# Patient Record
Sex: MALE | Race: WHITE | NOT HISPANIC OR LATINO | Employment: OTHER | URBAN - NONMETROPOLITAN AREA
[De-identification: names, ages, dates, MRNs, and addresses within clinical notes are randomized per-mention and may not be internally consistent; named-entity substitution may affect disease eponyms.]

---

## 2023-08-03 ENCOUNTER — HOSPITAL ENCOUNTER (EMERGENCY)
Facility: HOSPITAL | Age: 73
Discharge: HOME | End: 2023-08-03
Payer: MEDICARE

## 2023-08-03 VITALS
HEART RATE: 68 BPM | OXYGEN SATURATION: 97 % | SYSTOLIC BLOOD PRESSURE: 144 MMHG | BODY MASS INDEX: 24.38 KG/M2 | RESPIRATION RATE: 18 BRPM | WEIGHT: 190 LBS | DIASTOLIC BLOOD PRESSURE: 80 MMHG | HEIGHT: 74 IN | TEMPERATURE: 98 F

## 2023-08-03 DIAGNOSIS — J06.9 URI WITH COUGH AND CONGESTION: Primary | ICD-10-CM

## 2023-08-03 DIAGNOSIS — R05.9 COUGH: ICD-10-CM

## 2023-08-03 LAB
ALBUMIN SERPL BCP-MCNC: 3.4 G/DL (ref 3.5–5)
ALBUMIN/GLOB SERPL: 0.8 {RATIO}
ALP SERPL-CCNC: 91 U/L (ref 45–115)
ALT SERPL W P-5'-P-CCNC: 61 U/L (ref 16–61)
ANION GAP SERPL CALCULATED.3IONS-SCNC: 8 MMOL/L (ref 7–16)
AST SERPL W P-5'-P-CCNC: 55 U/L (ref 15–37)
BASOPHILS # BLD AUTO: 0.01 K/UL (ref 0–0.2)
BASOPHILS NFR BLD AUTO: 0.4 % (ref 0–1)
BILIRUB SERPL-MCNC: 0.5 MG/DL (ref ?–1.2)
BUN SERPL-MCNC: 17 MG/DL (ref 7–18)
BUN/CREAT SERPL: 17 (ref 6–20)
CALCIUM SERPL-MCNC: 8 MG/DL (ref 8.5–10.1)
CHLORIDE SERPL-SCNC: 103 MMOL/L (ref 98–107)
CO2 SERPL-SCNC: 33 MMOL/L (ref 21–32)
CREAT SERPL-MCNC: 1.01 MG/DL (ref 0.7–1.3)
DIFFERENTIAL METHOD BLD: ABNORMAL
EGFR (NO RACE VARIABLE) (RUSH/TITUS): 78 ML/MIN/1.73M2
EOSINOPHIL # BLD AUTO: 0.17 K/UL (ref 0–0.5)
EOSINOPHIL NFR BLD AUTO: 7.3 % (ref 1–4)
EOSINOPHIL NFR BLD MANUAL: 5 % (ref 1–4)
ERYTHROCYTE [DISTWIDTH] IN BLOOD BY AUTOMATED COUNT: 16 % (ref 11.5–14.5)
GLOBULIN SER-MCNC: 4.2 G/DL (ref 2–4)
GLUCOSE SERPL-MCNC: 103 MG/DL (ref 74–106)
HCT VFR BLD AUTO: 45.8 % (ref 40–54)
HGB BLD-MCNC: 15.3 G/DL (ref 13.5–18)
LYMPHOCYTES # BLD AUTO: 0.81 K/UL (ref 1–4.8)
LYMPHOCYTES NFR BLD AUTO: 34.6 % (ref 27–41)
LYMPHOCYTES NFR BLD MANUAL: 36 % (ref 27–41)
MCH RBC QN AUTO: 30.4 PG (ref 27–31)
MCHC RBC AUTO-ENTMCNC: 33.4 G/DL (ref 32–36)
MCV RBC AUTO: 91.1 FL (ref 80–96)
MONOCYTES # BLD AUTO: 0.32 K/UL (ref 0–0.8)
MONOCYTES NFR BLD AUTO: 13.7 % (ref 2–6)
MONOCYTES NFR BLD MANUAL: 15 % (ref 2–6)
MPC BLD CALC-MCNC: 11.3 FL (ref 9.4–12.4)
NEUTROPHILS # BLD AUTO: 1.03 K/UL (ref 1.8–7.7)
NEUTROPHILS NFR BLD AUTO: 44 % (ref 53–65)
NEUTS BAND NFR BLD MANUAL: 1 % (ref 1–5)
NEUTS SEG NFR BLD MANUAL: 43 % (ref 50–62)
NRBC BLD MANUAL-RTO: ABNORMAL %
PLATELET # BLD AUTO: 122 K/UL (ref 150–400)
POTASSIUM SERPL-SCNC: 3.9 MMOL/L (ref 3.5–5.1)
PROT SERPL-MCNC: 7.6 G/DL (ref 6.4–8.2)
RBC # BLD AUTO: 5.03 M/UL (ref 4.6–6.2)
SODIUM SERPL-SCNC: 140 MMOL/L (ref 136–145)
WBC # BLD AUTO: 2.34 K/UL (ref 4.5–11)

## 2023-08-03 PROCEDURE — 99284 EMERGENCY DEPT VISIT MOD MDM: CPT | Mod: 25

## 2023-08-03 PROCEDURE — 25000003 PHARM REV CODE 250: Performed by: NURSE PRACTITIONER

## 2023-08-03 PROCEDURE — 63600175 PHARM REV CODE 636 W HCPCS: Performed by: NURSE PRACTITIONER

## 2023-08-03 PROCEDURE — 85025 COMPLETE CBC W/AUTO DIFF WBC: CPT | Performed by: NURSE PRACTITIONER

## 2023-08-03 PROCEDURE — 80053 COMPREHEN METABOLIC PANEL: CPT | Performed by: NURSE PRACTITIONER

## 2023-08-03 PROCEDURE — 99284 EMERGENCY DEPT VISIT MOD MDM: CPT | Mod: GF | Performed by: NURSE PRACTITIONER

## 2023-08-03 PROCEDURE — 96372 THER/PROPH/DIAG INJ SC/IM: CPT | Performed by: NURSE PRACTITIONER

## 2023-08-03 RX ORDER — CEFTRIAXONE 1 G/1
1 INJECTION, POWDER, FOR SOLUTION INTRAMUSCULAR; INTRAVENOUS ONCE
Status: COMPLETED | OUTPATIENT
Start: 2023-08-03 | End: 2023-08-03

## 2023-08-03 RX ORDER — LIDOCAINE HYDROCHLORIDE 10 MG/ML
2.1 INJECTION INFILTRATION; PERINEURAL ONCE
Status: COMPLETED | OUTPATIENT
Start: 2023-08-03 | End: 2023-08-03

## 2023-08-03 RX ORDER — AZITHROMYCIN 250 MG/1
TABLET, FILM COATED ORAL
Qty: 6 TABLET | Refills: 0 | Status: SHIPPED | OUTPATIENT
Start: 2023-08-03 | End: 2023-08-08

## 2023-08-03 RX ADMIN — LIDOCAINE HYDROCHLORIDE 2.1 ML: 10 INJECTION, SOLUTION INFILTRATION; PERINEURAL at 04:08

## 2023-08-03 RX ADMIN — CEFTRIAXONE 1 G: 1 INJECTION, POWDER, FOR SOLUTION INTRAMUSCULAR; INTRAVENOUS at 04:08

## 2023-08-03 NOTE — ED PROVIDER NOTES
"Encounter Date: 8/3/2023       History     Chief Complaint   Patient presents with    Cough    Nasal Congestion     72 y/o WM wit PMHx of RA  presents to the ED with complaint of cough, nasal congestion and "feels wore down" x 1 week. Denies fever, cough is now productive, but denies fever. Has not been seen by a provider since symptoms started.  Denies sick contacts.    The history is provided by the patient.     Review of patient's allergies indicates:  No Known Allergies  Past Medical History:   Diagnosis Date    Arthritis     Hypertension      Past Surgical History:   Procedure Laterality Date    BACK SURGERY      CHOLECYSTECTOMY      JOINT REPLACEMENT       History reviewed. No pertinent family history.  Social History     Tobacco Use    Smoking status: Never    Smokeless tobacco: Never   Substance Use Topics    Alcohol use: Never    Drug use: Never     Review of Systems   Constitutional:  Positive for activity change, appetite change and fatigue. Negative for fever.   HENT:  Positive for congestion. Negative for ear pain, sore throat and trouble swallowing.    Eyes:  Negative for photophobia, pain and visual disturbance.   Respiratory:  Positive for cough and wheezing. Negative for shortness of breath.    Cardiovascular: Negative.  Negative for chest pain, palpitations and leg swelling.   Gastrointestinal:  Negative for abdominal pain, constipation, diarrhea, nausea and vomiting.   Genitourinary: Negative.  Negative for dysuria and frequency.   Musculoskeletal: Negative.    Skin: Negative.    Neurological:  Positive for weakness (generalized). Negative for headaches.   Psychiatric/Behavioral: Negative.         Physical Exam     Initial Vitals   BP Pulse Resp Temp SpO2   08/03/23 1415 08/03/23 1415 08/03/23 1415 08/03/23 1415 08/03/23 1429   (!) 144/80 68 18 98.1 °F (36.7 °C) 97 %      MAP       --                Physical Exam    Nursing note and vitals reviewed.  Constitutional: Vital signs are normal. He " appears well-developed and well-nourished. He is cooperative. He does not have a sickly appearance. He appears ill. No distress.   HENT:   Head: Normocephalic and atraumatic.   Right Ear: Tympanic membrane, external ear and ear canal normal.   Left Ear: Tympanic membrane, external ear and ear canal normal.   Nose: Nose normal. Right sinus exhibits no maxillary sinus tenderness and no frontal sinus tenderness. Left sinus exhibits no maxillary sinus tenderness and no frontal sinus tenderness.   Mouth/Throat: Uvula is midline, oropharynx is clear and moist and mucous membranes are normal.   Eyes: Conjunctivae are normal.   Neck: Neck supple.   Normal range of motion.   Full passive range of motion without pain.     Cardiovascular:  Normal rate, normal heart sounds, intact distal pulses and normal pulses.           Pulmonary/Chest: Effort normal and breath sounds normal.   Abdominal: Abdomen is soft. There is no abdominal tenderness.   Musculoskeletal:         General: Normal range of motion.      Cervical back: Full passive range of motion without pain, normal range of motion and neck supple.     Neurological: He is alert and oriented to person, place, and time. He has normal strength.   Skin: Skin is warm and dry. Capillary refill takes less than 2 seconds.   Psychiatric: He has a normal mood and affect. His speech is normal and behavior is normal. Judgment and thought content normal. Cognition and memory are normal.         Medical Screening Exam   See Full Note    ED Course   Procedures  Labs Reviewed   COMPREHENSIVE METABOLIC PANEL - Abnormal; Notable for the following components:       Result Value    CO2 33 (*)     Calcium 8.0 (*)     Albumin 3.4 (*)     Globulin 4.2 (*)     AST 55 (*)     All other components within normal limits   CBC WITH DIFFERENTIAL - Abnormal; Notable for the following components:    WBC 2.34 (*)     RDW 16.0 (*)     Platelet Count 122 (*)     Neutrophils % 44.0 (*)     Neutrophils, Abs  1.03 (*)     Lymphocytes, Absolute 0.81 (*)     Monocytes % 13.7 (*)     Eosinophils % 7.3 (*)     All other components within normal limits   MANUAL DIFFERENTIAL - Abnormal; Notable for the following components:    Segmented Neutrophils, Man % 43 (*)     Monocytes, Man % 15 (*)     Eosinophils, Man % 5 (*)     All other components within normal limits   CBC W/ AUTO DIFFERENTIAL    Narrative:     The following orders were created for panel order CBC auto differential.  Procedure                               Abnormality         Status                     ---------                               -----------         ------                     CBC with Differential[[id number]]        Abnormal            Final result               Manual Differential[[id number]]          Abnormal            Final result                 Please view results for these tests on the individual orders.          Imaging Results              X-Ray Chest PA And Lateral (Final result)  Result time 08/03/23 15:54:58      Final result by Yordan Vazquez DO (08/03/23 15:54:58)                   Impression:      No acute pulmonary disease    Findings suggesting pulmonary arterial hypertension      Electronically signed by: Yordan Vazquez  Date:    08/03/2023  Time:    15:54               Narrative:    EXAMINATION:  XR CHEST PA AND LATERAL    CLINICAL HISTORY:  Cough, unspecified    TECHNIQUE:  XR CHEST PA AND LATERAL    COMPARISON:  2016    FINDINGS:  No lines or tubes.    Lungs are clear.    Normal pleura.    Enlargement of the pulmonary arteries    Cardiac silhouette is similar to comparison exam.    No obvious acute bone findings.                                       Medications   cefTRIAXone injection 1 g (1 g Intramuscular Given 8/3/23 1632)   LIDOcaine HCL 10 mg/ml (1%) injection 2.1 mL (2.1 mLs Intramuscular Given 8/3/23 1632)     Medical Decision Making:   Initial Assessment:   VS wnl. Physical exam wnl. Appears in NAD.   Differential  Diagnosis:   -Post COVID/Viral illness  -Bronchitis  -URI with cough  Clinical Tests:   Lab Tests: Ordered and Reviewed  The following lab test(s) were unremarkable: CBC and CMP       <> Summary of Lab: CBC: 2.34 (little low due to RA medications), H&H 15.3/45.8, Na 140, K 3.9, BUN/CR 17/1.01, glucose 1.01.  Radiological Study: Ordered and Reviewed  ED Management:  -CXR:No acute pulmonary disease. Findings suggesting pulmonary arterial hypertension.     Discharge MDM  I discussed lab and CXR results with patient. Patient denies history of pulmonary hypertension. He has been seen by cardiology about 2 years ago for check up due to being on RA medications. Denies any history of CAD. I Believe his symptoms are more respiratory related to post viral illness.   Patient was managed in the ED with:  -Rocephin 1 gm IM X 1.  Prescription given for Zpak. May take Mucinex DM otc for cough and congestion. He will avoid NSAIDs and follow up with his cardiologist to rule out pulmonary artery hypertension that was suggested on CXR.   Patient was discharged in stable condition.  Detailed return precautions discussed. Patient agreed to treatment plan and verbalized understanding.                         Clinical Impression:   Final diagnoses:  [R05.9] Cough  [J06.9] URI with cough and congestion (Primary)        ED Disposition Condition    Discharge Stable          ED Prescriptions    None       Follow-up Information       Follow up With Specialties Details Why Contact Info    Primary Care Provider  Call  If symptoms worsen     Cardiology  Call in 1 day to schedule follow up visit for evaluation. CXR findings were suggestive of pulmanary arterial hypertension.              Brea Conn, [facility]  08/03/23 2269

## 2023-08-05 ENCOUNTER — TELEPHONE (OUTPATIENT)
Dept: EMERGENCY MEDICINE | Facility: HOSPITAL | Age: 73
End: 2023-08-05
Payer: OTHER GOVERNMENT

## 2025-01-24 ENCOUNTER — HOSPITAL ENCOUNTER (EMERGENCY)
Facility: HOSPITAL | Age: 75
Discharge: HOME | End: 2025-01-24
Payer: MEDICARE

## 2025-01-24 VITALS
WEIGHT: 208 LBS | BODY MASS INDEX: 27.57 KG/M2 | RESPIRATION RATE: 18 BRPM | HEIGHT: 73 IN | OXYGEN SATURATION: 97 % | SYSTOLIC BLOOD PRESSURE: 175 MMHG | DIASTOLIC BLOOD PRESSURE: 88 MMHG | TEMPERATURE: 98 F | HEART RATE: 83 BPM

## 2025-01-24 DIAGNOSIS — I10 PRIMARY HYPERTENSION: Primary | ICD-10-CM

## 2025-01-24 DIAGNOSIS — R06.02 SHORTNESS OF BREATH: ICD-10-CM

## 2025-01-24 DIAGNOSIS — R60.9 SWELLING: ICD-10-CM

## 2025-01-24 LAB
ALBUMIN SERPL BCP-MCNC: 3.6 G/DL (ref 3.4–4.8)
ALBUMIN/GLOB SERPL: 0.9 {RATIO}
ALP SERPL-CCNC: 71 U/L (ref 40–150)
ALT SERPL W P-5'-P-CCNC: 18 U/L
ANION GAP SERPL CALCULATED.3IONS-SCNC: 15 MMOL/L (ref 7–16)
AST SERPL W P-5'-P-CCNC: 35 U/L (ref 5–34)
BASOPHILS # BLD AUTO: 0.02 K/UL (ref 0–0.2)
BASOPHILS NFR BLD AUTO: 0.4 % (ref 0–1)
BILIRUB SERPL-MCNC: 1 MG/DL
BUN SERPL-MCNC: 19 MG/DL (ref 8–26)
BUN/CREAT SERPL: 15 (ref 6–20)
CALCIUM SERPL-MCNC: 9.4 MG/DL (ref 8.8–10)
CHLORIDE SERPL-SCNC: 106 MMOL/L (ref 98–107)
CO2 SERPL-SCNC: 25 MMOL/L (ref 23–31)
CREAT SERPL-MCNC: 1.24 MG/DL (ref 0.72–1.25)
DIFFERENTIAL METHOD BLD: ABNORMAL
EGFR (NO RACE VARIABLE) (RUSH/TITUS): 61 ML/MIN/1.73M2
EOSINOPHIL # BLD AUTO: 0.36 K/UL (ref 0–0.5)
EOSINOPHIL NFR BLD AUTO: 7.3 % (ref 1–4)
ERYTHROCYTE [DISTWIDTH] IN BLOOD BY AUTOMATED COUNT: 14.9 % (ref 11.5–14.5)
GLOBULIN SER-MCNC: 4.1 G/DL (ref 2–4)
GLUCOSE SERPL-MCNC: 103 MG/DL (ref 82–115)
HCT VFR BLD AUTO: 51.2 % (ref 40–54)
HGB BLD-MCNC: 16.7 G/DL (ref 13.5–18)
LYMPHOCYTES # BLD AUTO: 1.29 K/UL (ref 1–4.8)
LYMPHOCYTES NFR BLD AUTO: 26.3 % (ref 27–41)
MCH RBC QN AUTO: 29.6 PG (ref 27–31)
MCHC RBC AUTO-ENTMCNC: 32.6 G/DL (ref 32–36)
MCV RBC AUTO: 90.8 FL (ref 80–96)
MONOCYTES # BLD AUTO: 0.36 K/UL (ref 0–0.8)
MONOCYTES NFR BLD AUTO: 7.3 % (ref 2–6)
MPC BLD CALC-MCNC: 11.8 FL (ref 9.4–12.4)
NEUTROPHILS # BLD AUTO: 2.88 K/UL (ref 1.8–7.7)
NEUTROPHILS NFR BLD AUTO: 58.7 % (ref 53–65)
NT-PROBNP SERPL-MCNC: 318 PG/ML (ref 1–450)
PLATELET # BLD AUTO: 176 K/UL (ref 150–400)
POTASSIUM SERPL-SCNC: 3.5 MMOL/L (ref 3.5–5.1)
PROT SERPL-MCNC: 7.7 G/DL (ref 5.8–7.6)
RBC # BLD AUTO: 5.64 M/UL (ref 4.6–6.2)
SODIUM SERPL-SCNC: 142 MMOL/L (ref 136–145)
TROPONIN I SERPL HS-MCNC: 7.5 NG/L
WBC # BLD AUTO: 4.91 K/UL (ref 4.5–11)

## 2025-01-24 PROCEDURE — 96374 THER/PROPH/DIAG INJ IV PUSH: CPT

## 2025-01-24 PROCEDURE — 85025 COMPLETE CBC W/AUTO DIFF WBC: CPT | Performed by: NURSE PRACTITIONER

## 2025-01-24 PROCEDURE — 83880 ASSAY OF NATRIURETIC PEPTIDE: CPT | Performed by: NURSE PRACTITIONER

## 2025-01-24 PROCEDURE — 80053 COMPREHEN METABOLIC PANEL: CPT | Performed by: NURSE PRACTITIONER

## 2025-01-24 PROCEDURE — 36415 COLL VENOUS BLD VENIPUNCTURE: CPT | Performed by: NURSE PRACTITIONER

## 2025-01-24 PROCEDURE — 99285 EMERGENCY DEPT VISIT HI MDM: CPT | Mod: 25

## 2025-01-24 PROCEDURE — 25000003 PHARM REV CODE 250: Performed by: NURSE PRACTITIONER

## 2025-01-24 PROCEDURE — 84484 ASSAY OF TROPONIN QUANT: CPT | Performed by: NURSE PRACTITIONER

## 2025-01-24 PROCEDURE — 93005 ELECTROCARDIOGRAM TRACING: CPT

## 2025-01-24 PROCEDURE — 96375 TX/PRO/DX INJ NEW DRUG ADDON: CPT

## 2025-01-24 PROCEDURE — 99285 EMERGENCY DEPT VISIT HI MDM: CPT | Mod: GF | Performed by: NURSE PRACTITIONER

## 2025-01-24 PROCEDURE — 63600175 PHARM REV CODE 636 W HCPCS: Performed by: NURSE PRACTITIONER

## 2025-01-24 PROCEDURE — 93010 ELECTROCARDIOGRAM REPORT: CPT | Mod: ,,, | Performed by: INTERNAL MEDICINE

## 2025-01-24 RX ORDER — METOPROLOL TARTRATE 1 MG/ML
5 INJECTION, SOLUTION INTRAVENOUS
Status: DISCONTINUED | OUTPATIENT
Start: 2025-01-24 | End: 2025-01-24

## 2025-01-24 RX ORDER — NAPROXEN 500 MG/1
500 TABLET ORAL DAILY PRN
COMMUNITY
Start: 2024-12-30

## 2025-01-24 RX ORDER — FUROSEMIDE 10 MG/ML
40 INJECTION INTRAMUSCULAR; INTRAVENOUS
Status: COMPLETED | OUTPATIENT
Start: 2025-01-24 | End: 2025-01-24

## 2025-01-24 RX ORDER — INFLIXIMAB 100 MG/10ML
INJECTION, POWDER, LYOPHILIZED, FOR SOLUTION INTRAVENOUS
COMMUNITY

## 2025-01-24 RX ORDER — HYDRALAZINE HYDROCHLORIDE 20 MG/ML
10 INJECTION INTRAMUSCULAR; INTRAVENOUS
Status: COMPLETED | OUTPATIENT
Start: 2025-01-24 | End: 2025-01-24

## 2025-01-24 RX ORDER — AMLODIPINE BESYLATE 5 MG/1
5 TABLET ORAL
COMMUNITY
Start: 2024-11-04

## 2025-01-24 RX ORDER — LISINOPRIL 40 MG/1
40 TABLET ORAL
COMMUNITY
Start: 2024-11-04

## 2025-01-24 RX ORDER — POTASSIUM CHLORIDE 20 MEQ/1
20 TABLET, EXTENDED RELEASE ORAL DAILY
Qty: 30 TABLET | Refills: 0 | Status: SHIPPED | OUTPATIENT
Start: 2025-01-24 | End: 2025-02-23

## 2025-01-24 RX ORDER — FOLIC ACID 1 MG/1
1000 TABLET ORAL
COMMUNITY

## 2025-01-24 RX ORDER — POTASSIUM CHLORIDE 20 MEQ/1
40 TABLET, EXTENDED RELEASE ORAL
Status: COMPLETED | OUTPATIENT
Start: 2025-01-24 | End: 2025-01-24

## 2025-01-24 RX ORDER — METHOTREXATE 2.5 MG/1
TABLET ORAL
COMMUNITY
Start: 2024-06-13

## 2025-01-24 RX ORDER — FUROSEMIDE 20 MG/1
20 TABLET ORAL DAILY
Qty: 30 TABLET | Refills: 0 | Status: SHIPPED | OUTPATIENT
Start: 2025-01-24 | End: 2026-01-24

## 2025-01-24 RX ADMIN — FUROSEMIDE 40 MG: 10 INJECTION, SOLUTION INTRAMUSCULAR; INTRAVENOUS at 10:01

## 2025-01-24 RX ADMIN — POTASSIUM CHLORIDE 40 MEQ: 1500 TABLET, EXTENDED RELEASE ORAL at 12:01

## 2025-01-24 RX ADMIN — HYDRALAZINE HYDROCHLORIDE 10 MG: 20 INJECTION INTRAMUSCULAR; INTRAVENOUS at 11:01

## 2025-01-25 LAB
OHS QRS DURATION: 104 MS
OHS QTC CALCULATION: 433 MS

## 2025-01-26 NOTE — ED PROVIDER NOTES
Encounter Date: 1/24/2025    History     Chief Complaint   Patient presents with    Leg Swelling    Hypertension       Edema  Patient complains of edema in both feet and both lower legs. The edema has been moderate. Onset of symptoms was 2 weeks ago, and patient reports symptoms have gradually worsened since that time. The edema is present all day. The patient states the problem is new. The swelling has been aggravated by dependency of involved area. The swelling has been relieved by nothing. Associated factors include: weight gain. Cardiac risk factors include advanced age (older than 55 for men, 65 for women), hypertension, and male gender. He has not been taking his hydrochlorothiazide due to side effects.             Review of patient's allergies indicates:  No Known Allergies  Past Medical History:   Diagnosis Date    Arthritis     Hypertension      Past Surgical History:   Procedure Laterality Date    BACK SURGERY      CHOLECYSTECTOMY      CRANIOTOMY N/A     JOINT REPLACEMENT       No family history on file.  Social History     Tobacco Use    Smoking status: Never    Smokeless tobacco: Never   Substance Use Topics    Alcohol use: Never    Drug use: Never     Review of Systems   Constitutional:  Positive for fatigue. Negative for activity change, appetite change, fever and unexpected weight change.   Respiratory:  Negative for cough, chest tightness and shortness of breath.    Cardiovascular:  Positive for leg swelling. Negative for chest pain.   Gastrointestinal:  Negative for diarrhea, nausea and vomiting.   Musculoskeletal:  Negative for gait problem.   Psychiatric/Behavioral:  Negative for suicidal ideas.      Physical Exam     Initial Vitals [01/24/25 1010]   BP Pulse Resp Temp SpO2   (!) 236/99 65 16 97.6 °F (36.4 °C) 97 %      MAP       --         Physical Exam    Constitutional: Vital signs are normal. He appears well-developed and well-nourished. He is cooperative.   HENT:   Head: Normocephalic and  atraumatic.   Right Ear: Hearing normal.   Left Ear: Hearing normal.   Nose: Nose normal. Mouth/Throat: Mucous membranes are normal.   Eyes: Conjunctivae and lids are normal. Right eye exhibits no nystagmus. Left eye exhibits no nystagmus.   Neck: Trachea normal.   Normal range of motion.  Cardiovascular:  Normal rate and regular rhythm.           No murmur heard.  Musculoskeletal:      Cervical back: Normal range of motion.      Right lower le+ Edema present.      Left lower le+ Edema present.     Neurological: He is alert and oriented to person, place, and time. Gait normal.   Skin: Skin is warm.   Psychiatric: He has a normal mood and affect. His speech is normal and behavior is normal. Judgment and thought content normal. Cognition and memory are normal.       Medical Screening Exam   See Full Note    ED Course   Procedures  Labs Reviewed   COMPREHENSIVE METABOLIC PANEL - Abnormal       Result Value    Sodium 142      Potassium 3.5      Chloride 106      CO2 25      Anion Gap 15      Glucose 103      BUN 19      Creatinine 1.24      BUN/Creatinine Ratio 15      Calcium 9.4      Total Protein 7.7 (*)     Albumin 3.6      Globulin 4.1 (*)     A/G Ratio 0.9      Bilirubin, Total 1.0      Alk Phos 71      ALT 18      AST 35 (*)     eGFR 61     CBC WITH DIFFERENTIAL - Abnormal    WBC 4.91      RBC 5.64      Hemoglobin 16.7      Hematocrit 51.2      MCV 90.8      MCH 29.6      MCHC 32.6      RDW 14.9 (*)     Platelet Count 176      MPV 11.8      Neutrophils % 58.7      Lymphocytes % 26.3 (*)     Neutrophils, Abs 2.88      Lymphocytes, Absolute 1.29      Diff Type Auto      Monocytes % 7.3 (*)     Eosinophils % 7.3 (*)     Basophils % 0.4      Monocytes, Absolute 0.36      Eosinophils, Absolute 0.36      Basophils, Absolute 0.02     TROPONIN I - Normal    Troponin I High Sensitivity 7.5     NT-PRO NATRIURETIC PEPTIDE - Normal    ProBNP 318     CBC W/ AUTO DIFFERENTIAL    Narrative:     The following orders were  created for panel order CBC auto differential.  Procedure                               Abnormality         Status                     ---------                               -----------         ------                     CBC with Differential[[id number]]       Abnormal            Final result                 Please view results for these tests on the individual orders.       ECG Results              EKG 12-lead (Final result)        Collection Time Result Time QRS Duration OHS QTC Calculation    01/24/25 10:30:26 01/25/25 09:16:11 104 433                     Final result by Interface, [facility] (01/25/25 09:16:12)                   Narrative:    Test Reason : R06.02,    Vent. Rate :  59 BPM     Atrial Rate :  59 BPM     P-R Int : 230 ms          QRS Dur : 104 ms      QT Int : 438 ms       P-R-T Axes :  69  35  66 degrees    QTcB Int : 433 ms    Sinus bradycardia with 1st degree A-V block  Otherwise normal ECG  No previous ECGs available  Confirmed by Adithya Aden ([id number]) on 1/25/2025 9:16:05 AM    Referred By: AAAREFERRAL SELF           Confirmed By: Adithya Aden                                  Imaging Results              X-Ray Chest AP Portable (Final result)  Result time 01/24/25 11:38:13      Final result by Stanley Soliz MD (01/24/25 11:38:13)                   Impression:      Minor patchy opacities in the mid and lower lung zones could relate to subsegmental atelectasis, pulmonary vascular congestion/edema, infection, and/or noninfectious inflammatory etiology.      Electronically signed by: Stanley Soliz  Date:    01/24/2025  Time:    11:38               Narrative:    EXAMINATION:  XR CHEST AP PORTABLE    CLINICAL HISTORY:  CHF;    TECHNIQUE:  Single frontal view of the chest was performed.    COMPARISON:  Chest radiograph performed 08/03/2023    FINDINGS:  Cardiac contours grossly unchanged.  Atherosclerosis of the aorta.    Minor patchy opacities in the mid lower lung zones.    No  definite pneumothorax or large volume pleural effusion.    No definite pneumothorax or large volume pleural effusion.    No acute findings in the visualized abdomen.    Osseous and soft tissue structures appear without definite acute change.  Status post ACDF.                                       Medications   furosemide injection 40 mg (40 mg Intravenous Given 1/24/25 1034)   hydrALAZINE injection 10 mg (10 mg Intravenous Given 1/24/25 1113)   potassium chloride SA CR tablet 40 mEq (40 mEq Oral Given 1/24/25 1202)     Medical Decision Making  Patient presents for swelling and found to be volume overloaded based on history, exam, and work up. No previous diagnosis of CHF. No STEMI on EKG, negative troponin, and .Patient was given lasix 40 mg IV Push, with improvement in blood pressure and diuresis. Instructed patient follow up with cardiology and PCP ASAP.     Problems Addressed:  Primary hypertension: chronic illness or injury with exacerbation, progression, or side effects of treatment     Details: Discussed the importance of medication and follow up compliance to disease management.   Swelling: undiagnosed new problem with uncertain prognosis     Details: Prescribed Furosemide 20 mg oral daily with oral potassium replacement.     Amount and/or Complexity of Data Reviewed  Labs: ordered. Decision-making details documented in ED Course.  Radiology: ordered.    Risk  Prescription drug management.  Risk Details: Patient is elderly with poorly controlled hypertension, therefore he is moderate risk for morbidity/mortality.                 ED Course as of 01/26/25 1630   Fri Jan 24, 2025   1040 WBC: 4.91 [MM]   1040 RBC: 5.64 [MM]   1040 Hemoglobin: 16.7 [MM]   1040 Hematocrit: 51.2 [MM]   1040 RDW(!): 14.9 [MM]   1040 Lymph %(!): 26.3 [MM]   1040 Eos %(!): 7.3 [MM]   1112 Sodium: 142 [MM]   1112 Potassium: 3.5 [MM]   1112 Creatinine: 1.24 [MM]   1112 Calcium: 9.4 [MM]   1112 ALP: 71 [MM]   1112 ALT: 18 [MM]    1112 AST(!): 35 [MM]   1112 eGFR: 61 [MM]   1112 Troponin I High Sensitivity: 7.5 [MM]   1112 NT-proBNP: 318 [MM]      ED Course User Index  [MM] Xiomara Adams FNP            Clinical Impression:   Final diagnoses:  [R06.02] Shortness of breath  [I10] Primary hypertension (Primary)  [R60.9] Swelling        ED Disposition Condition    Discharge Stable          ED Prescriptions       Medication Sig Dispense Start Date End Date Auth. Provider    furosemide (LASIX) 20 MG tablet Take 1 tablet (20 mg total) by mouth once daily. 30 tablet 1/24/2025 1/24/2026 Xiomara Adams FNP    potassium chloride SA (K-DUR,KLOR-CON) 20 MEQ tablet Take 1 tablet (20 mEq total) by mouth once daily. 30 tablet 1/24/2025 2/23/2025 Xiomara Adams FNP          Follow-up Information       Follow up With Specialties Details Why Contact Info    Harjit Ramirez III, MD Interventional Cardiology, Cardiovascular Disease Schedule an appointment as soon as possible for a visit   [address] [place] [address] [place] [address]  [phone]      Ming Diaz MD Family Medicine Schedule an appointment as soon as possible for a visit   [address] [place] [address]  [address]  [place] [id number] [phone]               Xiomara Adams FNP  01/26/25 2726